# Patient Record
Sex: FEMALE | URBAN - METROPOLITAN AREA
[De-identification: names, ages, dates, MRNs, and addresses within clinical notes are randomized per-mention and may not be internally consistent; named-entity substitution may affect disease eponyms.]

---

## 2019-01-02 ENCOUNTER — TELEPHONE (OUTPATIENT)
Dept: NEUROLOGY | Facility: CLINIC | Age: 44
End: 2019-01-02

## 2019-02-11 ENCOUNTER — TELEPHONE (OUTPATIENT)
Dept: NEUROLOGY | Facility: CLINIC | Age: 44
End: 2019-02-11

## 2024-10-14 ENCOUNTER — TELEPHONE (OUTPATIENT)
Dept: OBGYN CLINIC | Facility: HOSPITAL | Age: 49
End: 2024-10-14

## 2024-10-14 NOTE — TELEPHONE ENCOUNTER
Caller: Krystian     Doctor: Pam / Isael    Reason for call: Patient is calling to schedule appointment for spinal decompression.   She would like to know if this is something Dr. Orozco does ?     She would also need to have orders for spinal manipulation with PT.    Patient had WC case that is now closed and she is proceeding with treatment on her own.    Patient has all her reports and CD's from imaging at [facility] in [place].     Please call patient to advise and schedule patient .     Call back#: [phone]    [facility]